# Patient Record
Sex: MALE | Race: WHITE | NOT HISPANIC OR LATINO | Employment: FULL TIME | URBAN - METROPOLITAN AREA
[De-identification: names, ages, dates, MRNs, and addresses within clinical notes are randomized per-mention and may not be internally consistent; named-entity substitution may affect disease eponyms.]

---

## 2023-10-16 ENCOUNTER — OFFICE VISIT (OUTPATIENT)
Dept: CARDIOLOGY | Facility: MEDICAL CENTER | Age: 53
End: 2023-10-16
Attending: INTERNAL MEDICINE
Payer: COMMERCIAL

## 2023-10-16 VITALS
DIASTOLIC BLOOD PRESSURE: 80 MMHG | HEART RATE: 75 BPM | BODY MASS INDEX: 41.75 KG/M2 | OXYGEN SATURATION: 95 % | RESPIRATION RATE: 16 BRPM | WEIGHT: 315 LBS | HEIGHT: 73 IN | SYSTOLIC BLOOD PRESSURE: 128 MMHG

## 2023-10-16 DIAGNOSIS — E66.3 OVERWEIGHT: ICD-10-CM

## 2023-10-16 DIAGNOSIS — G47.30 SLEEP APNEA, UNSPECIFIED TYPE: ICD-10-CM

## 2023-10-16 DIAGNOSIS — I10 HYPERTENSION, UNSPECIFIED TYPE: ICD-10-CM

## 2023-10-16 DIAGNOSIS — Z91.89 10 YEAR RISK OF MI OR STROKE < 7.5%: ICD-10-CM

## 2023-10-16 LAB — EKG IMPRESSION: NORMAL

## 2023-10-16 PROCEDURE — 93005 ELECTROCARDIOGRAM TRACING: CPT | Performed by: INTERNAL MEDICINE

## 2023-10-16 PROCEDURE — 93010 ELECTROCARDIOGRAM REPORT: CPT | Performed by: INTERNAL MEDICINE

## 2023-10-16 PROCEDURE — 99202 OFFICE O/P NEW SF 15 MIN: CPT | Performed by: INTERNAL MEDICINE

## 2023-10-16 PROCEDURE — 99204 OFFICE O/P NEW MOD 45 MIN: CPT | Performed by: INTERNAL MEDICINE

## 2023-10-16 PROCEDURE — 3074F SYST BP LT 130 MM HG: CPT | Performed by: INTERNAL MEDICINE

## 2023-10-16 PROCEDURE — 3079F DIAST BP 80-89 MM HG: CPT | Performed by: INTERNAL MEDICINE

## 2023-10-16 RX ORDER — AMLODIPINE BESYLATE 5 MG/1
5 TABLET ORAL
Qty: 30 TABLET | Refills: 3 | Status: SHIPPED | OUTPATIENT
Start: 2023-10-16 | End: 2023-11-29 | Stop reason: SDUPTHER

## 2023-10-16 ASSESSMENT — ENCOUNTER SYMPTOMS
HEARTBURN: 0
ALTERED MENTAL STATUS: 0
DIZZINESS: 0
ORTHOPNEA: 0
VOMITING: 0
NEAR-SYNCOPE: 0
CLAUDICATION: 0
PND: 0
COUGH: 0
NAUSEA: 0
SHORTNESS OF BREATH: 0
DECREASED APPETITE: 0
PALPITATIONS: 0
ABDOMINAL PAIN: 0
SYNCOPE: 0
DIARRHEA: 0
DYSPNEA ON EXERTION: 0
FLANK PAIN: 0
WEIGHT GAIN: 0
FEVER: 0
BACK PAIN: 0
DEPRESSION: 0
CONSTIPATION: 0
WEIGHT LOSS: 0
BLURRED VISION: 0
IRREGULAR HEARTBEAT: 0

## 2023-10-16 NOTE — PROGRESS NOTES
Cardiology Note    Chief Complaint   Patient presents with    Hypertension    Shortness of Breath       History of Present Illness: Pravin Lake is a 52 y.o. male PMH obesity, who presents for initial visit.     Last Monday noted high BP 170s/110s associated with headache. Associated with palpitations; short lived less than a minute. Next day SBP 160s. This has since resolved. Recalls at the time had been recommended some type of salt to treat hypertension but had the opposite of desired effect. Has since discontinued and values improved. Admits drinks at least about two drinks alcohol per day. Has been trying to lose weight but not successful. Previously did well on keto diet but this was years ago. Cannot sleep on his back due to snoring, shortness of breath and wakes with same. No other toxic social habits. Father with hypertension.    Review of Systems   Constitutional: Negative for decreased appetite, fever, malaise/fatigue, weight gain and weight loss.   HENT:  Negative for congestion and nosebleeds.    Eyes:  Negative for blurred vision.   Cardiovascular:  Negative for chest pain, claudication, dyspnea on exertion, irregular heartbeat, leg swelling, near-syncope, orthopnea, palpitations, paroxysmal nocturnal dyspnea and syncope.   Respiratory:  Negative for cough and shortness of breath.    Endocrine: Negative for cold intolerance and heat intolerance.   Skin:  Negative for rash.   Musculoskeletal:  Negative for back pain.   Gastrointestinal:  Negative for abdominal pain, constipation, diarrhea, heartburn, melena, nausea and vomiting.   Genitourinary:  Negative for dysuria, flank pain and hematuria.   Neurological:  Negative for dizziness.   Psychiatric/Behavioral:  Negative for altered mental status and depression.          History reviewed. No pertinent past medical history.      History reviewed. No pertinent surgical history.      Current Outpatient Medications   Medication Sig Dispense Refill     "amLODIPine (NORVASC) 5 MG Tab Take 1 Tablet by mouth 1 time a day as needed (for blood pressure over 140/90). 30 Tablet 3     No current facility-administered medications for this visit.         No Known Allergies      History reviewed. No pertinent family history.      Social History     Socioeconomic History    Marital status:      Spouse name: Not on file    Number of children: Not on file    Years of education: Not on file    Highest education level: Not on file   Occupational History    Not on file   Tobacco Use    Smoking status: Never    Smokeless tobacco: Never   Substance and Sexual Activity    Alcohol use: Yes     Comment: 7 drinks a week    Drug use: Never    Sexual activity: Not on file   Other Topics Concern    Not on file   Social History Narrative    Not on file     Social Determinants of Health     Financial Resource Strain: Not on file   Food Insecurity: Not on file   Transportation Needs: Not on file   Physical Activity: Not on file   Stress: Not on file   Social Connections: Not on file   Intimate Partner Violence: Not on file   Housing Stability: Not on file         Physical Exam:  Ambulatory Vitals  /80 (BP Location: Left arm, Patient Position: Sitting, BP Cuff Size: Adult)   Pulse 75   Resp 16   Ht 1.854 m (6' 1\")   Wt (!) 183 kg (404 lb)   SpO2 95%    BP Readings from Last 4 Encounters:   10/16/23 128/80   09/24/12 138/88     Weight/BMI:   Vitals:    10/16/23 0911   BP: 128/80   Weight: (!) 183 kg (404 lb)   Height: 1.854 m (6' 1\")    Body mass index is 53.3 kg/m².  Wt Readings from Last 4 Encounters:   10/16/23 (!) 183 kg (404 lb)       Physical Exam  Constitutional:       General: He is not in acute distress.  HENT:      Head: Normocephalic and atraumatic.   Eyes:      Conjunctiva/sclera: Conjunctivae normal.      Pupils: Pupils are equal, round, and reactive to light.   Neck:      Vascular: No JVD.   Cardiovascular:      Rate and Rhythm: Normal rate and regular rhythm.     " " Heart sounds: Normal heart sounds. No murmur heard.     No friction rub. No gallop.   Pulmonary:      Effort: Pulmonary effort is normal. No respiratory distress.      Breath sounds: Normal breath sounds. No wheezing or rales.   Chest:      Chest wall: No tenderness.   Abdominal:      General: Bowel sounds are normal. There is no distension.      Palpations: Abdomen is soft.   Musculoskeletal:      Cervical back: Normal range of motion and neck supple.   Skin:     General: Skin is warm and dry.   Neurological:      Mental Status: He is alert and oriented to person, place, and time.   Psychiatric:         Mood and Affect: Affect normal.         Judgment: Judgment normal.         Lab Data Review:  No results found for: \"CHOLSTRLTOT\", \"LDL\", \"HDL\", \"TRIGLYCERIDE\"    No results found for: \"SODIUM\", \"POTASSIUM\", \"CHLORIDE\", \"CO2\", \"GLUCOSE\", \"BUN\", \"CREATININE\", \"BUNCREATRAT\", \"GLOMRATE\"  CrCl cannot be calculated (No successful lab value found.).  No results found for: \"ALKPHOSPHAT\", \"ASTSGOT\", \"ALTSGPT\", \"TBILIRUBIN\"   No results found for: \"WBC\", \"HCT\"  No results found for: \"HBA1C\"  No components found for: \"TROP\"      Cardiac Imaging and Procedures Review:      EKG 10/16/23 interpreted by me sinus, NIVCD - unchanged since 9/24/12    Medical Decision Making:  Problem List Items Addressed This Visit       10 year risk of MI or stroke < 7.5%    Hypertension    Relevant Medications    amLODIPine (NORVASC) 5 MG Tab    Other Relevant Orders    EKG (Completed)    Sleep apnea    Relevant Orders    Referral to Pulmonary and Sleep Medicine    Overweight    Relevant Orders    Referral to Bariatric Surgery     Overweight - refer for bariatric surgery.    Sleep apnea - refer to sleep medicine.    HTN - goal <140/90. Add prn amlodipine. Pursue weight loss and sleep apnea management.    Low 10 year risk ascvd. Diet and exercise modification for cholesterol control.     It was my pleasure to meet with Mr. Lake.              "

## 2023-10-20 ENCOUNTER — TELEPHONE (OUTPATIENT)
Dept: HEALTH INFORMATION MANAGEMENT | Facility: OTHER | Age: 53
End: 2023-10-20
Payer: COMMERCIAL

## 2023-11-29 ENCOUNTER — PATIENT MESSAGE (OUTPATIENT)
Dept: CARDIOLOGY | Facility: MEDICAL CENTER | Age: 53
End: 2023-11-29
Payer: COMMERCIAL

## 2023-11-29 DIAGNOSIS — I10 HYPERTENSION, UNSPECIFIED TYPE: ICD-10-CM

## 2023-11-29 RX ORDER — AMLODIPINE BESYLATE 10 MG/1
10 TABLET ORAL DAILY
Qty: 90 TABLET | Refills: 3 | Status: SHIPPED | OUTPATIENT
Start: 2023-11-29

## 2023-11-29 NOTE — PATIENT COMMUNICATION
To VR: Patient sent MyChart with recent BP log with average of 149/91, asking for recommendations for elevated pressure. Please review and advise, thank you!

## 2023-12-28 ENCOUNTER — PATIENT MESSAGE (OUTPATIENT)
Dept: CARDIOLOGY | Facility: MEDICAL CENTER | Age: 53
End: 2023-12-28
Payer: COMMERCIAL

## 2024-01-11 NOTE — PATIENT COMMUNICATION
Rose: I did confirm with family that they were told patient could get in sooner than May with [facility]. Thanks for double checking!

## 2024-01-11 NOTE — PATIENT COMMUNICATION
Rose: Can we get this patient's sleep medicine referral rerouted to [facility]?    VR: BP range is 144/92 to 154/85.   Per Patient Message Encounter from 11/29/23, Hydralazine to be ordered if  BP remains high. Please advise on dosage!

## 2024-01-11 NOTE — PATIENT COMMUNICATION
Rose Swan  You4 hours ago (8:12 AM)     Hello. I just want to re-confirm to reroute because I see that patient is scheduled at [facility], in May, however.  Please re-confirm.  Thank you    Abena Agosto R.N.  Rose Swan; Kayode Diana M.D.  Phone Number: [phone]     ===========  -Phone Number Called: [phone]    Call outcome:  Spoke with pt daughter    Message:  Dtr confirmed patient will be able to get an appointment at [facility] in February. Ok to proceed with transfer

## 2024-01-12 DIAGNOSIS — I10 HYPERTENSION, UNSPECIFIED TYPE: ICD-10-CM

## 2024-01-12 RX ORDER — CHLORTHALIDONE 25 MG/1
25 TABLET ORAL DAILY
Qty: 90 TABLET | Refills: 4 | Status: SHIPPED | OUTPATIENT
Start: 2024-01-12

## 2024-04-22 ENCOUNTER — PATIENT MESSAGE (OUTPATIENT)
Dept: CARDIOLOGY | Facility: MEDICAL CENTER | Age: 54
End: 2024-04-22
Payer: COMMERCIAL

## 2024-05-08 ENCOUNTER — PATIENT MESSAGE (OUTPATIENT)
Dept: CARDIOLOGY | Facility: MEDICAL CENTER | Age: 54
End: 2024-05-08
Payer: COMMERCIAL

## 2024-05-09 DIAGNOSIS — I10 HYPERTENSION, UNSPECIFIED TYPE: ICD-10-CM

## 2024-05-09 RX ORDER — CHLORTHALIDONE 50 MG/1
50 TABLET ORAL DAILY
Qty: 30 TABLET | Refills: 3 | Status: SHIPPED | OUTPATIENT
Start: 2024-05-09

## 2024-05-30 DIAGNOSIS — I10 HYPERTENSION, UNSPECIFIED TYPE: ICD-10-CM

## 2024-05-31 ENCOUNTER — RESEARCH ENCOUNTER (OUTPATIENT)
Dept: RESEARCH | Facility: MEDICAL CENTER | Age: 54
End: 2024-05-31
Payer: COMMERCIAL

## 2024-05-31 NOTE — RESEARCH NOTE
Patient responded to [facility] [employer] and has been scheduled to provide a saliva sample following Cardiology appointment; Patient has signed consent forms and is currently ELF ineligible;

## 2024-06-07 ENCOUNTER — OFFICE VISIT (OUTPATIENT)
Dept: CARDIOLOGY | Facility: MEDICAL CENTER | Age: 54
End: 2024-06-07
Attending: INTERNAL MEDICINE
Payer: COMMERCIAL

## 2024-06-07 VITALS
SYSTOLIC BLOOD PRESSURE: 134 MMHG | OXYGEN SATURATION: 95 % | BODY MASS INDEX: 41.75 KG/M2 | RESPIRATION RATE: 16 BRPM | DIASTOLIC BLOOD PRESSURE: 82 MMHG | HEIGHT: 73 IN | WEIGHT: 315 LBS | HEART RATE: 76 BPM

## 2024-06-07 DIAGNOSIS — E66.3 OVERWEIGHT: ICD-10-CM

## 2024-06-07 DIAGNOSIS — Z91.89 10 YEAR RISK OF MI OR STROKE < 7.5%: ICD-10-CM

## 2024-06-07 DIAGNOSIS — I10 HYPERTENSION, UNSPECIFIED TYPE: ICD-10-CM

## 2024-06-07 DIAGNOSIS — G47.30 SLEEP APNEA, UNSPECIFIED TYPE: ICD-10-CM

## 2024-06-07 DIAGNOSIS — Z00.6 RESEARCH STUDY PATIENT: ICD-10-CM

## 2024-06-07 PROCEDURE — 99211 OFF/OP EST MAY X REQ PHY/QHP: CPT | Performed by: INTERNAL MEDICINE

## 2024-06-07 PROCEDURE — G2211 COMPLEX E/M VISIT ADD ON: HCPCS | Performed by: INTERNAL MEDICINE

## 2024-06-07 PROCEDURE — 3079F DIAST BP 80-89 MM HG: CPT | Performed by: INTERNAL MEDICINE

## 2024-06-07 PROCEDURE — 99212 OFFICE O/P EST SF 10 MIN: CPT | Performed by: INTERNAL MEDICINE

## 2024-06-07 PROCEDURE — 99214 OFFICE O/P EST MOD 30 MIN: CPT | Performed by: INTERNAL MEDICINE

## 2024-06-07 PROCEDURE — 3075F SYST BP GE 130 - 139MM HG: CPT | Performed by: INTERNAL MEDICINE

## 2024-06-07 RX ORDER — AMLODIPINE BESYLATE 10 MG/1
10 TABLET ORAL DAILY
Qty: 90 TABLET | Refills: 4 | Status: SHIPPED | OUTPATIENT
Start: 2024-06-07

## 2024-06-07 RX ORDER — CHLORTHALIDONE 50 MG/1
50 TABLET ORAL DAILY
Qty: 90 TABLET | Refills: 4 | Status: SHIPPED | OUTPATIENT
Start: 2024-06-07

## 2024-06-07 ASSESSMENT — ENCOUNTER SYMPTOMS
HEARTBURN: 0
DECREASED APPETITE: 0
PND: 0
NEAR-SYNCOPE: 0
PALPITATIONS: 0
ORTHOPNEA: 0
ALTERED MENTAL STATUS: 0
BACK PAIN: 0
WEIGHT LOSS: 0
DIZZINESS: 0
CONSTIPATION: 0
CLAUDICATION: 0
COUGH: 0
DEPRESSION: 0
BLURRED VISION: 0
SYNCOPE: 0
DIARRHEA: 0
ABDOMINAL PAIN: 0
VOMITING: 0
NAUSEA: 0
FLANK PAIN: 0
WEIGHT GAIN: 0
SHORTNESS OF BREATH: 0
DYSPNEA ON EXERTION: 0
FEVER: 0
IRREGULAR HEARTBEAT: 0

## 2024-06-07 NOTE — PROGRESS NOTES
Cardiology Note    Chief Complaint   Patient presents with    Hypertension       History of Present Illness: Pravin Lake is a 53 y.o. male PMH obesity, HTN who presents for follow up visit.     Doing well. No active cardiac complaints. Resolved headaches and eye pressure symptoms since increase in antihypertensives. Had sleep study. Pending follow up with sleep medicine for management sleep apnea. Decided against bariatric consult and will attempt to lose weight on his own.     Review of Systems   Constitutional: Negative for decreased appetite, fever, malaise/fatigue, weight gain and weight loss.   HENT:  Negative for congestion and nosebleeds.    Eyes:  Negative for blurred vision.   Cardiovascular:  Negative for chest pain, claudication, dyspnea on exertion, irregular heartbeat, leg swelling, near-syncope, orthopnea, palpitations, paroxysmal nocturnal dyspnea and syncope.   Respiratory:  Negative for cough and shortness of breath.    Endocrine: Negative for cold intolerance and heat intolerance.   Skin:  Negative for rash.   Musculoskeletal:  Negative for back pain.   Gastrointestinal:  Negative for abdominal pain, constipation, diarrhea, heartburn, melena, nausea and vomiting.   Genitourinary:  Negative for dysuria, flank pain and hematuria.   Neurological:  Negative for dizziness.   Psychiatric/Behavioral:  Negative for altered mental status and depression.          History reviewed. No pertinent past medical history.      History reviewed. No pertinent surgical history.      Current Outpatient Medications   Medication Sig Dispense Refill    amLODIPine (NORVASC) 10 MG Tab Take 1 Tablet by mouth every day. 90 Tablet 4    chlorthalidone (HYGROTON) 50 MG Tab Take 1 Tablet by mouth every day. 90 Tablet 4     No current facility-administered medications for this visit.         No Known Allergies      History reviewed. No pertinent family history.      Social History     Socioeconomic History    Marital status:  "     Spouse name: Not on file    Number of children: Not on file    Years of education: Not on file    Highest education level: Not on file   Occupational History    Not on file   Tobacco Use    Smoking status: Never    Smokeless tobacco: Never   Substance and Sexual Activity    Alcohol use: Yes     Comment: 7 drinks a week    Drug use: Never    Sexual activity: Not on file   Other Topics Concern    Not on file   Social History Narrative    Not on file     Social Determinants of Health     Financial Resource Strain: Not on file   Food Insecurity: Not on file   Transportation Needs: Not on file   Physical Activity: Not on file   Stress: Not on file   Social Connections: Not on file   Intimate Partner Violence: Not on file   Housing Stability: Not on file         Physical Exam:  Ambulatory Vitals  /82 (BP Location: Left arm, Patient Position: Sitting, BP Cuff Size: Adult)   Pulse 76   Resp 16   Ht 1.854 m (6' 1\")   Wt (!) 180 kg (396 lb 9.6 oz)   SpO2 95%    BP Readings from Last 4 Encounters:   06/07/24 134/82   10/16/23 128/80   09/24/12 138/88     Weight/BMI:   Vitals:    06/07/24 0835   BP: 134/82   Weight: (!) 180 kg (396 lb 9.6 oz)   Height: 1.854 m (6' 1\")    Body mass index is 52.33 kg/m².  Wt Readings from Last 4 Encounters:   06/07/24 (!) 180 kg (396 lb 9.6 oz)   10/16/23 (!) 183 kg (404 lb)       Physical Exam  Constitutional:       General: He is not in acute distress.  HENT:      Head: Normocephalic and atraumatic.   Eyes:      Conjunctiva/sclera: Conjunctivae normal.      Pupils: Pupils are equal, round, and reactive to light.   Neck:      Vascular: No JVD.   Cardiovascular:      Rate and Rhythm: Normal rate and regular rhythm.      Heart sounds: Normal heart sounds. No murmur heard.     No friction rub. No gallop.   Pulmonary:      Effort: Pulmonary effort is normal. No respiratory distress.      Breath sounds: Normal breath sounds. No wheezing or rales.   Chest:      Chest wall: No " "tenderness.   Abdominal:      General: Bowel sounds are normal. There is no distension.      Palpations: Abdomen is soft.   Musculoskeletal:      Cervical back: Normal range of motion and neck supple.   Skin:     General: Skin is warm and dry.   Neurological:      Mental Status: He is alert and oriented to person, place, and time.   Psychiatric:         Mood and Affect: Affect normal.         Judgment: Judgment normal.         Lab Data Review:  No results found for: \"CHOLSTRLTOT\", \"LDL\", \"HDL\", \"TRIGLYCERIDE\"    Lab Results   Component Value Date/Time    SODIUM 142 10/20/2023 10:17 AM    POTASSIUM 4.7 10/20/2023 10:17 AM    CHLORIDE 105 10/20/2023 10:17 AM    CO2 29.0 10/20/2023 10:17 AM    GLUCOSE 108 10/20/2023 10:17 AM    BUN 9.0 10/20/2023 10:17 AM    CREATININE 1.00 10/20/2023 10:17 AM    BUNCREATRAT 9.0 (L) 10/20/2023 10:17 AM     CrCl cannot be calculated (Patient's most recent lab result is older than the maximum 7 days allowed.).  Lab Results   Component Value Date/Time    ALKPHOSPHAT 62 10/20/2023 10:17 AM    ASTSGOT 35 10/20/2023 10:17 AM    ALTSGPT 47 (H) 10/20/2023 10:17 AM    TBILIRUBIN 0.7 10/20/2023 10:17 AM      No results found for: \"WBC\", \"HCT\"  No results found for: \"HBA1C\"  No components found for: \"TROP\"      Cardiac Imaging and Procedures Review:      EKG 10/16/23 interpreted by me sinus, NIVCD - unchanged since 9/24/12    Medical Decision Making:  Problem List Items Addressed This Visit       10 year risk of MI or stroke < 7.5%    Hypertension    Relevant Medications    amLODIPine (NORVASC) 10 MG Tab    chlorthalidone (HYGROTON) 50 MG Tab    Sleep apnea    Overweight    Relevant Orders    Referral to Nutrition Services       Overweight - previously referred for bariatric surgery however he would prefer to do himself. Refer to nutrition to optimize diet for weight loss. Goal BMI <30kg/m2, ideally 5% weight loss per quarter.    Sleep apnea - follow up with sleep medicine.    HTN - goal <140/90. " Controlled. Continue regimen.     Low 10 year risk ascvd. Diet and exercise modification for cholesterol control.     It was my pleasure to meet with Mr. Lake.    Today's visit is associated with medical care services that serve as the continuing focal point for all necessary health care services related to the patient's single, serious condition or multiple chronic complex conditions.  This includes providing services to the patient on an ongoing basis that results in care that is collaborative and personalized to the patient.  The services result in a comprehensive, longitudinal, and continuous relationship with the patient and involve delivery of team-based care that is accessible, coordinated with other practitioners and providers, and integrated with the broader health care landscape.

## 2024-06-07 NOTE — RESEARCH NOTE
Confirmed with the participant which designated provider they would like study results shared with. Patient will have an opportunity to share the results with any providers of their choosing in the future by accessing their results from [employer].

## 2024-06-24 LAB
APOB+LDLR+PCSK9 GENE MUT ANL BLD/T: NOT DETECTED
BRCA1+BRCA2 DEL+DUP + FULL MUT ANL BLD/T: NOT DETECTED
MLH1+MSH2+MSH6+PMS2 GN DEL+DUP+FUL M: NOT DETECTED

## 2025-08-25 ENCOUNTER — PATIENT MESSAGE (OUTPATIENT)
Dept: CARDIOLOGY | Facility: MEDICAL CENTER | Age: 55
End: 2025-08-25
Payer: COMMERCIAL